# Patient Record
Sex: FEMALE | NOT HISPANIC OR LATINO | Employment: PART TIME | URBAN - METROPOLITAN AREA
[De-identification: names, ages, dates, MRNs, and addresses within clinical notes are randomized per-mention and may not be internally consistent; named-entity substitution may affect disease eponyms.]

---

## 2025-04-09 ENCOUNTER — TELEPHONE (OUTPATIENT)
Dept: OBSTETRICS AND GYNECOLOGY | Facility: CLINIC | Age: 40
End: 2025-04-09
Payer: COMMERCIAL

## 2025-06-02 ENCOUNTER — APPOINTMENT (OUTPATIENT)
Dept: OBSTETRICS AND GYNECOLOGY | Facility: CLINIC | Age: 40
End: 2025-06-02
Payer: COMMERCIAL

## 2025-06-02 VITALS
WEIGHT: 194 LBS | BODY MASS INDEX: 32.32 KG/M2 | HEIGHT: 65 IN | DIASTOLIC BLOOD PRESSURE: 70 MMHG | SYSTOLIC BLOOD PRESSURE: 124 MMHG

## 2025-06-02 DIAGNOSIS — R10.2 PELVIC PAIN: Primary | ICD-10-CM

## 2025-06-02 DIAGNOSIS — D25.9 UTERINE LEIOMYOMA, UNSPECIFIED LOCATION: ICD-10-CM

## 2025-06-02 PROCEDURE — 3008F BODY MASS INDEX DOCD: CPT | Performed by: OBSTETRICS & GYNECOLOGY

## 2025-06-02 PROCEDURE — 1036F TOBACCO NON-USER: CPT | Performed by: OBSTETRICS & GYNECOLOGY

## 2025-06-02 PROCEDURE — 99204 OFFICE O/P NEW MOD 45 MIN: CPT | Performed by: OBSTETRICS & GYNECOLOGY

## 2025-06-02 RX ORDER — ESKETAMINE HYDROCHLORIDE 28 MG/.2ML
SOLUTION NASAL
COMMUNITY
Start: 2023-11-06

## 2025-06-02 RX ORDER — PAROXETINE 30 MG/1
30 TABLET, FILM COATED ORAL DAILY
COMMUNITY

## 2025-06-02 RX ORDER — ESKETAMINE HYDROCHLORIDE 28 MG/.2ML
SOLUTION NASAL
COMMUNITY
Start: 2025-04-23

## 2025-06-02 RX ORDER — PAROXETINE 40 MG/1
40 TABLET, FILM COATED ORAL
COMMUNITY
Start: 2024-04-30

## 2025-06-02 RX ORDER — PAROXETINE 20 MG/1
TABLET, FILM COATED ORAL
COMMUNITY
Start: 2022-01-04

## 2025-06-02 RX ORDER — DILTIAZEM HYDROCHLORIDE 240 MG/1
240 CAPSULE, EXTENDED RELEASE ORAL DAILY
COMMUNITY
Start: 2025-01-08

## 2025-06-02 RX ORDER — CICLOPIROX 80 MG/ML
SOLUTION TOPICAL
COMMUNITY

## 2025-06-02 RX ORDER — ESKETAMINE HYDROCHLORIDE 28 MG/.2ML
SOLUTION NASAL
COMMUNITY
Start: 2025-05-30

## 2025-06-02 RX ORDER — ETONOGESTREL 68 MG/1
1 IMPLANT SUBCUTANEOUS
COMMUNITY

## 2025-06-02 RX ORDER — LORATADINE 10 MG/1
10 TABLET ORAL DAILY
COMMUNITY

## 2025-06-02 RX ORDER — LOSARTAN POTASSIUM 50 MG/1
50 TABLET ORAL
COMMUNITY
Start: 2025-05-02

## 2025-06-02 RX ORDER — ALBUTEROL SULFATE 90 UG/1
2 INHALANT RESPIRATORY (INHALATION) EVERY 4 HOURS PRN
COMMUNITY

## 2025-06-02 RX ORDER — ERGOCALCIFEROL 1.25 MG/1
1.25 CAPSULE ORAL
COMMUNITY
Start: 2025-01-30 | End: 2025-07-04

## 2025-06-02 ASSESSMENT — ENCOUNTER SYMPTOMS
RESPIRATORY NEGATIVE: 1
MUSCULOSKELETAL NEGATIVE: 1
GASTROINTESTINAL NEGATIVE: 1
CONSTITUTIONAL NEGATIVE: 1
PSYCHIATRIC NEGATIVE: 1
CARDIOVASCULAR NEGATIVE: 1
NEUROLOGICAL NEGATIVE: 1

## 2025-06-02 NOTE — PATIENT INSTRUCTIONS
Gynecology Visit for Pelvic Pain:  You were seen in office today for pelvic pain  Your exam was normal in office today  A pelvic ultrasound was ordered for you to evaluate the cause of your pelvic pain  Ovarian cysts are a common cause of pelvic pain and generally benign. Other causes of pelvic pain include endometriosis, adenomyosis, pelvic infections, and non gynecologic causes.   Final plan for management will depend on your imaging findings. You will receive results by phone/MyChart   Monitor for severe pelvic pain, sudden nausea/vomiting, difficulty with bowel or bladder function.   Call the office for any concerns. [phone] ([place]) or [phone] ([place])

## 2025-06-02 NOTE — PROGRESS NOTES
"Subjective   Patient ID: Aicha Camacho is a 40 y.o. female who presents for New Patient Visit.  HPI    Patient presents for complaint of fibroids, pelvic pain, CS scar pain, and other gynecologic issues.    Patient states is most concerned with her uterine fibroid. She had an ultrasound done in November of 2024 for pelvic pain. On that US her uterus was noted to be 9 cm with a 2.5 cm fibroid. She feels that some of her pain is due to her CS scar. She states her CS scar pain has proved since that time, with self scar tissue release.     She additionally concerned that she has not had a period since having her Nexplanon inserted in January of this year. She was having regular periods prior to her Nexplanon insertion, every 28 days, lasting 2-3 days, with normal flow.     She has questions regarding her most recent pap smear. She states she noted it was abnormal on her MyChart but the results were never explained to her. In review of her chart the results are NIL/HPV pos, 16/18 neg.      Review of Systems   Constitutional: Negative.    Respiratory: Negative.     Cardiovascular: Negative.    Gastrointestinal: Negative.    Genitourinary: Negative.    Musculoskeletal: Negative.    Neurological: Negative.    Psychiatric/Behavioral: Negative.         Objective   Visit Vitals  /70   Ht 1.651 m (5' 5\")   Wt 88 kg (194 lb)   BMI 32.28 kg/m²   OB Status Implant   Smoking Status Never   BSA 2.01 m²       Physical Exam  Constitutional:       Appearance: Normal appearance.   Pulmonary:      Effort: Pulmonary effort is normal.   Genitourinary:     Comments: Vulva normal in appearance without discoloration, rashes, lesions. Urethral meatus normal in appearance, without masses. Vagina is negative for blood, discharge, lesions. Uterus is small, mobile, non tender. There is no cervical motion tenderness, adnexal masses/tenderness. Perineum and anus with normal architecture and without rashes, lesions, " discoloration.    Neurological:      Mental Status: She is alert.   Psychiatric:         Mood and Affect: Mood normal.         Behavior: Behavior normal.         Assessment/Plan   Problem List Items Addressed This Visit    None  Visit Diagnoses         Codes      Pelvic pain    -  Primary R10.2      Uterine leiomyoma, unspecified location     D25.9    Relevant Orders    US PELVIS TRANSABDOMINAL WITH TRANSVAGINAL          Discussed findings on exam and clinical course to date  Discussed fibroid finding on US done for pelvic pain. Advised that fibroids are benign collections of fibrous tissue in the uterus. Advised these can sometimes cause AUB due to recruited blood supply and preventing the uterus from efficiently gina to stop bleeding. Advised this is most common in fibroids that are large and either intramural or submucosal. Advised fibroids are often painless but can cause discomfort if large or causing mass effect. Discussed as of her last US her fibroid was 2.5 cm and there was no mention of location in the report. Repeat US to evaluate stability and location ordered.  Discussed pap results. Advised her cells appeared normal but tested positive for HPV. Advised HPV 16 and 18 are the strains most commonly associated with cervical cancer. Advised she tested negative for these strains, meaning she was positive for one of the lower risk strains. Advised with this finding the recommendation is to repeat the pap smear in 1 year to give the immune system time to clear the active infection and assess for the development of dysplasia.   Discussed her previous incision pain. Discussed likely residual/prolonged discomfort due to complicated recovery resulting in opening of her incision and closure by secondary intent. Pain has improved at this time, would not recommend further imaging.   Discussed amenorrhea can occur with Nexplanon use. Advised that the progesterone from the Nexplanon will keep her lining thin and  stable, so this is not problematic unless it bothers her. Patient is ok with amenorrhea as long as it is not dangerous.   Advised I will call with US results, and plan to see her in October for a RA and repeat pap         Chacha Orellana,  06/02/25 2:25 PM

## 2025-07-15 ENCOUNTER — HOSPITAL ENCOUNTER (OUTPATIENT)
Dept: RADIOLOGY | Facility: CLINIC | Age: 40
Discharge: HOME | End: 2025-07-15
Payer: COMMERCIAL

## 2025-07-15 DIAGNOSIS — D25.9 UTERINE LEIOMYOMA, UNSPECIFIED LOCATION: ICD-10-CM

## 2025-07-15 PROCEDURE — 76830 TRANSVAGINAL US NON-OB: CPT | Performed by: RADIOLOGY

## 2025-07-15 PROCEDURE — 76856 US EXAM PELVIC COMPLETE: CPT

## 2025-07-15 PROCEDURE — 76857 US EXAM PELVIC LIMITED: CPT | Performed by: RADIOLOGY
